# Patient Record
Sex: FEMALE | Race: AMERICAN INDIAN OR ALASKA NATIVE
[De-identification: names, ages, dates, MRNs, and addresses within clinical notes are randomized per-mention and may not be internally consistent; named-entity substitution may affect disease eponyms.]

---

## 2018-06-12 ENCOUNTER — HOSPITAL ENCOUNTER (EMERGENCY)
Dept: HOSPITAL 5 - ED | Age: 23
Discharge: HOME | End: 2018-06-12
Payer: MEDICAID

## 2018-06-12 VITALS — SYSTOLIC BLOOD PRESSURE: 111 MMHG | DIASTOLIC BLOOD PRESSURE: 70 MMHG

## 2018-06-12 DIAGNOSIS — N12: Primary | ICD-10-CM

## 2018-06-12 LAB
ALBUMIN SERPL-MCNC: 3.8 G/DL (ref 3.9–5)
ALT SERPL-CCNC: 16 UNITS/L (ref 7–56)
BACTERIA #/AREA URNS HPF: (no result) /HPF
BASOPHILS # (AUTO): 0 K/MM3 (ref 0–0.1)
BASOPHILS NFR BLD AUTO: 0.1 % (ref 0–1.8)
BILIRUB UR QL STRIP: (no result)
BLOOD UR QL VISUAL: (no result)
BUN SERPL-MCNC: 11 MG/DL (ref 7–17)
BUN/CREAT SERPL: 22 %
CALCIUM SERPL-MCNC: 9.2 MG/DL (ref 8.4–10.2)
EOSINOPHIL # BLD AUTO: 0 K/MM3 (ref 0–0.4)
EOSINOPHIL NFR BLD AUTO: 0.2 % (ref 0–4.3)
HCT VFR BLD CALC: 37.4 % (ref 30.3–42.9)
HEMOLYSIS INDEX: 1
HGB BLD-MCNC: 12.6 GM/DL (ref 10.1–14.3)
LIPASE SERPL-CCNC: 10 UNITS/L (ref 13–60)
LYMPHOCYTES # BLD AUTO: 1.8 K/MM3 (ref 1.2–5.4)
LYMPHOCYTES NFR BLD AUTO: 9 % (ref 13.4–35)
MCH RBC QN AUTO: 33 PG (ref 28–32)
MCHC RBC AUTO-ENTMCNC: 34 % (ref 30–34)
MCV RBC AUTO: 98 FL (ref 79–97)
MONOCYTES # (AUTO): 2.4 K/MM3 (ref 0–0.8)
MONOCYTES % (AUTO): 12 % (ref 0–7.3)
MUCOUS THREADS #/AREA URNS HPF: (no result) /HPF
PH UR STRIP: 5 [PH] (ref 5–7)
PLATELET # BLD: 322 K/MM3 (ref 140–440)
RBC # BLD AUTO: 3.81 M/MM3 (ref 3.65–5.03)
RBC #/AREA URNS HPF: 16 /HPF (ref 0–6)
UROBILINOGEN UR-MCNC: < 2 MG/DL (ref ?–2)
WBC #/AREA URNS HPF: > 182 /HPF (ref 0–6)

## 2018-06-12 PROCEDURE — 96374 THER/PROPH/DIAG INJ IV PUSH: CPT

## 2018-06-12 PROCEDURE — 83690 ASSAY OF LIPASE: CPT

## 2018-06-12 PROCEDURE — 81001 URINALYSIS AUTO W/SCOPE: CPT

## 2018-06-12 PROCEDURE — 84703 CHORIONIC GONADOTROPIN ASSAY: CPT

## 2018-06-12 PROCEDURE — 96361 HYDRATE IV INFUSION ADD-ON: CPT

## 2018-06-12 PROCEDURE — 80053 COMPREHEN METABOLIC PANEL: CPT

## 2018-06-12 PROCEDURE — 96375 TX/PRO/DX INJ NEW DRUG ADDON: CPT

## 2018-06-12 PROCEDURE — 99284 EMERGENCY DEPT VISIT MOD MDM: CPT

## 2018-06-12 PROCEDURE — 74177 CT ABD & PELVIS W/CONTRAST: CPT

## 2018-06-12 PROCEDURE — 85025 COMPLETE CBC W/AUTO DIFF WBC: CPT

## 2018-06-12 PROCEDURE — 36415 COLL VENOUS BLD VENIPUNCTURE: CPT

## 2018-06-12 NOTE — CAT SCAN REPORT
FINAL REPORT



EXAM:  CT ABDOMEN PELVIS W CON



HISTORY:  RLQ pain 



TECHNIQUE:  CT abdomen and pelvis performed.  Images extend from

diaphragm to pubic symphysis.  100 cc Omnipaque 300 IV was

administered. No oral contrast was administered. Axial images and

coronal and sagittal reformatted images were obtained. 



PRIORS:  None.



FINDINGS:  

The visualized aspects of the lung bases are clear.

There is mild-to-moderate right hydronephrosis but no obstructing

stone identified. Heterogeneous areas of low attenuation in the

anterior right kidney with appearance suspicious for

pyelonephritis. This includes a more masslike area medially also

likely on the basis of pyelonephritis. Actual mass is considered

less likely.  

The visualized liver, spleen, pancreas, adrenal glands and left

kidney demonstrate no significant abnormalities.

There is no abdominal aortic aneurysm.

There is no evidence of intestinal obstruction.

The appendix is normal.

There is no free intraperitoneal air.

There are no abnormal fluid collections seen.

The bladder is unremarkable.

There is trace dependent fluid in the cul-de-sac. 



IMPRESSION:  

There are findings compatible with right pyelonephritis.

Specifically there are heterogeneous areas of low-density in the

anterior right kidney including a 1.6 cm masslike area of

low-density medially. This is unlikely to represent an actual

mass and likely related to the pyelonephritis. As a precaution,

follow-up can be obtained after resolution of infectious process.





There is mild right hydronephrosis but no obstructing stones

seen. This also likely related to the inflammatory process.

## 2018-06-12 NOTE — EMERGENCY DEPARTMENT REPORT
ED Abdominal Pain HPI





- General


Chief Complaint: Abdominal Pain


Stated Complaint: ABDOMINAL PAIN


Time Seen by Provider: 18 08:54


Source: patient, EMS


Mode of arrival: Ambulatory


Limitations: No Limitations





- History of Present Illness


Initial Comments: 


There is a 22-year-old -American female who presents for right lower 

quadrant pain started yesterday pain is 8/10 sharp exacerbated by movement and 

palpation last bowel movement yesterday diarrhea patient denies fevers or 

chills does endorse nausea no vomiting to this point no vaginal discharge no 

bleeding there is moderate a 10 right flank pain patient is taken over-the-

counter NSAIDs all day, emesis 1 at 3 because she couldn't bear the pain 

anymore last menstrual cycle 2 weeks ago





MD Complaint: abdominal pain, flank pain


Onset/Timin


-: Sudden


Time: 18:00


Location: RLQ, R flank


Radiation: RLQ


Migration to: RLQ


Severity: moderate


Severity scale (0 -10): 7


Quality: aching, sharp


Consistency: constant


Improves With: nothing


Worsens With: movement


Associated Symptoms: nausea, diarrhea.  denies: vomiting, fever, chills, 

constipation, dysuria, hematemesis, hematochezia, melena, hematuria, anorexia, 

syncope





- Related Data


LMP (females 10-50): last week


 Previous Rx's











 Medication  Instructions  Recorded  Last Taken  Type


 


Ciprofloxacin HCl [Cipro] 500 mg PO BID #20 tablet 18 Unknown Rx


 


Ibuprofen 800 mg PO TID PRN #30 tablet 18 Unknown Rx


 


Ondansetron [Zofran Odt] 4 mg PO TID PRN #15 tab.rapdis 18 Unknown Rx











 Allergies











Allergy/AdvReac Type Severity Reaction Status Date / Time


 


No Known Allergies Allergy   Unverified 18 06:15














ED Review of Systems


ROS: 


Stated complaint: ABDOMINAL PAIN


Other details as noted in HPI





Constitutional: denies: chills, fever


Eyes: denies: eye pain, eye discharge, vision change


ENT: denies: ear pain, throat pain


Respiratory: denies: cough, shortness of breath, wheezing


Cardiovascular: denies: chest pain, palpitations


Endocrine: no symptoms reported


Gastrointestinal: abdominal pain, nausea.  denies: vomiting, diarrhea, 

constipation, hematemesis, melena, hematochezia


Genitourinary: as per HPI.  denies: urgency, dysuria, frequency, hematuria, 

discharge, abnormal menses


Musculoskeletal: back pain


Skin: denies: rash, lesions


Neurological: denies: headache, weakness, paresthesias


Psychiatric: denies: anxiety, depression


Hematological/Lymphatic: denies: easy bleeding, easy bruising





ED Past Medical Hx





- Past Medical History


Previous Medical History?: No





- Surgical History


Past Surgical History?: No





- Social History


Smoking Status: Never Smoker





- Medications


Home Medications: 


 Home Medications











 Medication  Instructions  Recorded  Confirmed  Last Taken  Type


 


Ciprofloxacin HCl [Cipro] 500 mg PO BID #20 tablet 18  Unknown Rx


 


Ibuprofen 800 mg PO TID PRN #30 tablet 18  Unknown Rx


 


Ondansetron [Zofran Odt] 4 mg PO TID PRN #15 tab.rapdis 18  Unknown Rx














ED Physical Exam





- General


Limitations: No Limitations


General appearance: alert, anxious





- Head


Head exam: Present: atraumatic, normocephalic





- Eye


Eye exam: Present: normal appearance





- ENT


ENT exam: Present: mucous membranes moist





- Neck


Neck exam: Present: normal inspection





- Respiratory


Respiratory exam: Present: normal lung sounds bilaterally.  Absent: respiratory 

distress, wheezes, stridor





- Cardiovascular


Cardiovascular Exam: Present: regular rate, normal rhythm, normal heart sounds.

  Absent: systolic murmur, diastolic murmur, rubs, gallop





- GI/Abdominal


GI/Abdominal exam: Present: soft, guarding (mild guarding RLQ ), normal bowel 

sounds, hyperactive bowel sounds.  Absent: rebound, rigid, organomegaly, mass, 

bruit, pulsatile mass, hernia





- Expanded GI/Abdominal Exam


  ** Expanded


GI/Abdominal exam: Present: heel tap sign, tenderness at [place].  

Absent: psoas sign, obturator sign, Gallagher's sign, Rovsing's sign, ascites





- Rectal


Rectal exam: Present: deferred





- 


External exam: Present: other (deferred )





- Extremities Exam


Extremities exam: Present: normal inspection





- Back Exam


Back exam: Present: normal inspection, full ROM, CVA tenderness (R), CVA 

tenderness (L).  Absent: tenderness, muscle spasm, paraspinal tenderness, 

vertebral tenderness





- Neurological Exam


Neurological exam: Present: alert, oriented X3, normal gait.  Absent: motor 

sensory deficit, reflexes normal





- Psychiatric


Psychiatric exam: Present: normal affect, normal mood





- Skin


Skin exam: Present: warm, dry, intact, normal color





ED Course


 Vital Signs











  18





  06:17 09:18 09:48


 


Temperature 98.4 F  


 


Pulse Rate 100 H  


 


Respiratory 20 22 20





Rate   


 


Blood Pressure 111/70  


 


O2 Sat by Pulse 100  





Oximetry   














ED Medical Decision Making





- Lab Data


Result diagrams: 


 18 06:49





 18 06:49





- Radiology Data


Radiology results: report reviewed, image reviewed


 pyelonephritis plan: dc to home with cipro, ibuprofen, zofran prn follow up 

with [facility] in 2-3 days 








- Medical Decision Making


This is bilateral lower patient's symptoms improved after Rocephin and IV 

fluids there is no nausea vomiting patient tolerating by mouth intake without 

symptoms abdominal pain reduced to 110 plan DC'd home in stable condition with 

Cipro R Profen when necessary pain and Zofran ODT when necessary nausea is 

given strict instructions to return to ED his symptoms worsen patient 

verbalized understanding and agreement with same discharged home in stable 

condition at this time





Critical care attestation.: 


If time is entered above; I have spent that time in minutes in the direct care 

of this critically ill patient, excluding procedure time.








ED Disposition


Clinical Impression: 


 Pyelonephritis





Disposition: DC-01 TO HOME OR SELFCARE


Is pt being admited?: No


Does the pt Need Aspirin: No


Condition: Good


Instructions:  Abdominal Pain (ED), Acute Pyelonephritis (ED)


Prescriptions: 


Ciprofloxacin HCl [Cipro] 500 mg PO BID #20 tablet


Ibuprofen 800 mg PO TID PRN #30 tablet


 PRN Reason: Pain


Ondansetron [Zofran Odt] 4 mg PO TID PRN #15 tab.rapdis


 PRN Reason: Nausea And Vomiting


Referrals: 


[facility] [Outside] - 3-5 Days


Forms:  Work/School Release Form(ED)


Time of Disposition: 11:17

## 2020-12-09 ENCOUNTER — HOSPITAL ENCOUNTER (EMERGENCY)
Dept: HOSPITAL 5 - ED | Age: 25
Discharge: LEFT BEFORE DISCHARGE | End: 2020-12-09
Payer: SELF-PAY

## 2020-12-09 VITALS — DIASTOLIC BLOOD PRESSURE: 93 MMHG | SYSTOLIC BLOOD PRESSURE: 157 MMHG

## 2020-12-09 DIAGNOSIS — Z53.21: ICD-10-CM

## 2020-12-09 DIAGNOSIS — R10.9: Primary | ICD-10-CM
